# Patient Record
Sex: FEMALE | Race: WHITE
[De-identification: names, ages, dates, MRNs, and addresses within clinical notes are randomized per-mention and may not be internally consistent; named-entity substitution may affect disease eponyms.]

---

## 2018-08-21 ENCOUNTER — HOSPITAL ENCOUNTER (OUTPATIENT)
Dept: HOSPITAL 95 - ORSCSDS | Age: 73
Discharge: HOME | End: 2018-08-21
Attending: ORTHOPAEDIC SURGERY
Payer: MEDICARE

## 2018-08-21 VITALS — BODY MASS INDEX: 22.47 KG/M2 | HEIGHT: 64.02 IN | WEIGHT: 131.59 LBS

## 2018-08-21 DIAGNOSIS — K21.9: ICD-10-CM

## 2018-08-21 DIAGNOSIS — Z87.891: ICD-10-CM

## 2018-08-21 DIAGNOSIS — G56.02: Primary | ICD-10-CM

## 2018-08-21 DIAGNOSIS — J44.9: ICD-10-CM

## 2018-08-21 PROCEDURE — 01N50ZZ RELEASE MEDIAN NERVE, OPEN APPROACH: ICD-10-PCS | Performed by: ORTHOPAEDIC SURGERY

## 2021-08-03 NOTE — NUR
08/03/21 1035 LINDA HOLLAND
INCENTIVE SPIROMETER GIVEN WITH TEACHING AS SHE HAS COPD AND NOT
TAKING REGULAR DEEP BREATHS.  STATES PAIN IS 5/10